# Patient Record
(demographics unavailable — no encounter records)

---

## 2025-06-04 NOTE — ASSESSMENT
[FreeTextEntry1] : - 6/4/25  with chronic nasal obstruction.  On physical exam there was evidence of nasal valve collapse with nasal septal deviation otherwise fairly patent nasal airway. At this time, I am recommending trial of Breathe Right strips, saline sinus rinses, and nasal steroids with follow up in 4-6 weeks. Options for further intervention discussed include in office procedure to address nasal valve and turbinates.  Will discuss at next visit  If headaches persisting, I would then recommend neurology referral.    - Discontinue Afrin  - Breathe Right strips, saline sinus rinses, nasal steroids - Follow up in 4-6 weeks. - If symptoms persist consider treatments for nasal valve collapse

## 2025-06-04 NOTE — HISTORY OF PRESENT ILLNESS
[de-identified] : - 6/4/25 30 y/o F presents with multiple ENT complaints for last 1.5 years.  First, complains of nasal breathing issues left>right as well as headaches and left>right facial pressure x 1.5 years. Nasal breathing affecting her sleep and ability to exercise. Headaches 3/week last throughout the day, becoming worse and more persistent, left>right facial pressure between and behind her eyes as well as cheeks and lasts throughout the day. Also posterior left neck pain that is typically pinpoint but can radiate from her face. Reports significant dyspnea with exertion and is concerned that she has exercise induced asthma, no prior testing.  Occasionally she feels throat soreness.   No issues chewing, eating, or swallowing. No other breathing issues. No nasal drainage, smell changes, or dental pain. No improvement with sleep quality with mandibular advancement device given to her after home sleep study 1 year ago reportedly showed mild sleep apnea. Uses Afrin once a week with temporary relief, no rebound congestion. History of recurrent sinusitis in childhood, no antibiotics for this in several years. No known bruxism.

## 2025-06-04 NOTE — PHYSICAL EXAM
[Normal] : mucosa is normal [Midline] : trachea located in midline position [de-identified] : +modified Dillon's maneuver bilaterally

## 2025-06-04 NOTE — PROCEDURE
[FreeTextEntry6] : - Procedure Note    Pre-operative Diagnosis: Nasal congestion Post-operative Diagnosis: Slight septal deviation, turbinate hypertrophy Anesthesia: Topical Procedure: Bilateral nasal endoscopy    Procedure Details:  After topical anesthesia and decongestant, the patient was placed in the supine position. The telescope was passed along the left nasal floor to the nasopharynx. It was then passed into the region of the middle meatus, middle turbinate, and the sphenoethmoid region.  An identical procedure was performed on the right side.     Findings:  Mucosa: 	                normal	 Nasal septum: 	Left-sided septal deviation Discharge: 	none	 Turbinates: 	Turbinate hypertrophy Adenoid: 	                normal	 Posterior choanae: 	normal	 Eustachian tubes: 	normal	 Mucous stranding: 	normal 	 Lesions: 	                Not present	    Comments:  Condition: Stable. Patient tolerated procedure well. Complications: None

## 2025-06-04 NOTE — REASON FOR VISIT
[Initial Consultation] : an initial consultation for [FreeTextEntry2] : nasal breathing issues, poor sleep, headaches